# Patient Record
Sex: MALE | Race: OTHER | URBAN - METROPOLITAN AREA
[De-identification: names, ages, dates, MRNs, and addresses within clinical notes are randomized per-mention and may not be internally consistent; named-entity substitution may affect disease eponyms.]

---

## 2021-04-22 ENCOUNTER — OFFICE VISIT (OUTPATIENT)
Dept: FAMILY MEDICINE CLINIC | Facility: CLINIC | Age: 21
End: 2021-04-22
Payer: MEDICAID

## 2021-04-22 VITALS
DIASTOLIC BLOOD PRESSURE: 80 MMHG | SYSTOLIC BLOOD PRESSURE: 121 MMHG | HEART RATE: 68 BPM | HEIGHT: 72 IN | BODY MASS INDEX: 26.68 KG/M2 | WEIGHT: 197 LBS

## 2021-04-22 DIAGNOSIS — M26.623 BILATERAL TEMPOROMANDIBULAR JOINT PAIN: ICD-10-CM

## 2021-04-22 DIAGNOSIS — R51.9 ACUTE NONINTRACTABLE HEADACHE, UNSPECIFIED HEADACHE TYPE: Primary | ICD-10-CM

## 2021-04-22 DIAGNOSIS — G43.009 NONINTRACTABLE MIGRAINE, UNSPECIFIED MIGRAINE TYPE: ICD-10-CM

## 2021-04-22 PROCEDURE — 99202 OFFICE O/P NEW SF 15 MIN: CPT | Performed by: FAMILY MEDICINE

## 2021-04-22 PROCEDURE — 3079F DIAST BP 80-89 MM HG: CPT | Performed by: FAMILY MEDICINE

## 2021-04-22 PROCEDURE — 3074F SYST BP LT 130 MM HG: CPT | Performed by: FAMILY MEDICINE

## 2021-04-22 PROCEDURE — 3008F BODY MASS INDEX DOCD: CPT | Performed by: FAMILY MEDICINE

## 2021-04-22 RX ORDER — ONDANSETRON 4 MG/1
TABLET, ORALLY DISINTEGRATING ORAL
COMMUNITY
End: 2021-04-22 | Stop reason: ALTCHOICE

## 2021-04-22 NOTE — PATIENT INSTRUCTIONS
TAKE 4 TABS OF IBUPROFEN 200MG UP TO 3 TIMES DAILY WITH FOOD NO ALCOHOL   Understanding Temporomandibular Disorders (TMD)    Do you have pain in your face, jaw, or teeth? Do you have trouble chewing? Does your jaw make clicking or popping noises?  These sym in your life. · Follow your treatment plan. · Pay attention to your body and get help if symptoms return. Zuleima last reviewed this educational content on 8/1/2020  © 4871-0165 [employer]. All rights reserved.  This information is not in throbbing pain felt on one (most common) or both sides (less common) of the head. You may feel nauseated or vomit.  This headache may also be preceded or associated with changes in sight (like seeing spots or flashes of light), ability to speak, or sensatio migraine is a type of severe headache. Having a migraine can be very painful. But there are steps you can take to help prevent migraines.    Medicines to help prevent migraines   · Your healthcare provider may prescribe certain medicines to help prevent selam professional medical care. Always follow your healthcare professional's instructions. Preventing Migraine Headaches: Triggers  The first step in preventing migraines is to learn what triggers them.  You may then be able to control your triggers to av example, make sure to get enough rest and drink plenty of water while you're traveling. Make sure to carry a hat, sunglasses, and your medicines. Be alert for migraine symptoms, so you can treat a migraine early if it happens.   Zuleima last reviewed this

## 2021-04-22 NOTE — PROGRESS NOTES
Blood pressure 121/80, pulse 68, height 6' (1.829 m), weight 197 lb (89.4 kg). Headaches for over a year. Gets about 100 mg of caffeine daily. Denies nausea or vomiting. No family history of migraines. Does get photophobia and phonophobia.   Often wa

## (undated) NOTE — LETTER
01/24/22        [address] [place] [phone]      Dear Syd Marie,    [facility] records indicate that you have outstanding lab work and or testing that was ordered for you and has not yet been completed:  Orders Placed This Encounter

## (undated) NOTE — LETTER
07/22/21        [address] [place] [phone]      Dear Megan Galindo,    [address] records indicate that you have outstanding lab work and or testing that was ordered for you and has not yet been completed:  Orders Placed This Encounter

## (undated) NOTE — LETTER
10/22/21        [address] [place] Becerrarebecca Nashсветлана [place] [phone]      Dear Aurelia Navarrete,    [address] [facility] records indicate that you have outstanding lab work and or testing that was ordered for you and has not yet been completed:  Orders Placed This Encounter